# Patient Record
Sex: MALE | Race: OTHER | NOT HISPANIC OR LATINO | Employment: FULL TIME | URBAN - METROPOLITAN AREA
[De-identification: names, ages, dates, MRNs, and addresses within clinical notes are randomized per-mention and may not be internally consistent; named-entity substitution may affect disease eponyms.]

---

## 2023-02-14 ENCOUNTER — LAB VISIT (OUTPATIENT)
Dept: LAB | Facility: HOSPITAL | Age: 53
End: 2023-02-14
Attending: FAMILY MEDICINE
Payer: COMMERCIAL

## 2023-02-14 ENCOUNTER — OFFICE VISIT (OUTPATIENT)
Dept: FAMILY MEDICINE | Facility: CLINIC | Age: 53
End: 2023-02-14
Payer: COMMERCIAL

## 2023-02-14 VITALS
BODY MASS INDEX: 29.59 KG/M2 | TEMPERATURE: 98 F | HEART RATE: 64 BPM | WEIGHT: 156.75 LBS | SYSTOLIC BLOOD PRESSURE: 100 MMHG | HEIGHT: 61 IN | DIASTOLIC BLOOD PRESSURE: 68 MMHG | OXYGEN SATURATION: 98 %

## 2023-02-14 DIAGNOSIS — R53.83 FATIGUE, UNSPECIFIED TYPE: ICD-10-CM

## 2023-02-14 DIAGNOSIS — R63.0 DECREASED APPETITE: ICD-10-CM

## 2023-02-14 DIAGNOSIS — R63.4 WEIGHT LOSS: ICD-10-CM

## 2023-02-14 DIAGNOSIS — Z11.4 SCREENING FOR HIV (HUMAN IMMUNODEFICIENCY VIRUS): ICD-10-CM

## 2023-02-14 DIAGNOSIS — R10.13 EPIGASTRIC PAIN: ICD-10-CM

## 2023-02-14 DIAGNOSIS — Z12.11 ENCOUNTER FOR SCREENING COLONOSCOPY: ICD-10-CM

## 2023-02-14 DIAGNOSIS — Z11.59 ENCOUNTER FOR HEPATITIS C SCREENING TEST FOR LOW RISK PATIENT: ICD-10-CM

## 2023-02-14 DIAGNOSIS — K21.9 GASTROESOPHAGEAL REFLUX DISEASE, UNSPECIFIED WHETHER ESOPHAGITIS PRESENT: ICD-10-CM

## 2023-02-14 DIAGNOSIS — R10.13 EPIGASTRIC PAIN: Primary | ICD-10-CM

## 2023-02-14 LAB
ALBUMIN SERPL BCP-MCNC: 4.3 G/DL (ref 3.5–5.2)
ALP SERPL-CCNC: 98 U/L (ref 55–135)
ALT SERPL W/O P-5'-P-CCNC: 21 U/L (ref 10–44)
ANION GAP SERPL CALC-SCNC: 16 MMOL/L (ref 8–16)
AST SERPL-CCNC: 21 U/L (ref 10–40)
BASOPHILS # BLD AUTO: 0.16 K/UL (ref 0–0.2)
BASOPHILS NFR BLD: 1.4 % (ref 0–1.9)
BILIRUB SERPL-MCNC: 0.4 MG/DL (ref 0.1–1)
BUN SERPL-MCNC: 20 MG/DL (ref 6–20)
CALCIUM SERPL-MCNC: 10.3 MG/DL (ref 8.7–10.5)
CHLORIDE SERPL-SCNC: 103 MMOL/L (ref 95–110)
CHOLEST SERPL-MCNC: 241 MG/DL (ref 120–199)
CHOLEST/HDLC SERPL: 5.4 {RATIO} (ref 2–5)
CO2 SERPL-SCNC: 22 MMOL/L (ref 23–29)
CREAT SERPL-MCNC: 1.1 MG/DL (ref 0.5–1.4)
DIFFERENTIAL METHOD: ABNORMAL
EOSINOPHIL # BLD AUTO: 1.5 K/UL (ref 0–0.5)
EOSINOPHIL NFR BLD: 13.5 % (ref 0–8)
ERYTHROCYTE [DISTWIDTH] IN BLOOD BY AUTOMATED COUNT: 16.1 % (ref 11.5–14.5)
EST. GFR  (NO RACE VARIABLE): >60 ML/MIN/1.73 M^2
ESTIMATED AVG GLUCOSE: 108 MG/DL (ref 68–131)
GLUCOSE SERPL-MCNC: 72 MG/DL (ref 70–110)
HBA1C MFR BLD: 5.4 % (ref 4–5.6)
HCT VFR BLD AUTO: 47.4 % (ref 40–54)
HDLC SERPL-MCNC: 45 MG/DL (ref 40–75)
HDLC SERPL: 18.7 % (ref 20–50)
HGB BLD-MCNC: 14.6 G/DL (ref 14–18)
IMM GRANULOCYTES # BLD AUTO: 0.03 K/UL (ref 0–0.04)
IMM GRANULOCYTES NFR BLD AUTO: 0.3 % (ref 0–0.5)
LDLC SERPL CALC-MCNC: 168.2 MG/DL (ref 63–159)
LYMPHOCYTES # BLD AUTO: 2.9 K/UL (ref 1–4.8)
LYMPHOCYTES NFR BLD: 26 % (ref 18–48)
MCH RBC QN AUTO: 25 PG (ref 27–31)
MCHC RBC AUTO-ENTMCNC: 30.8 G/DL (ref 32–36)
MCV RBC AUTO: 81 FL (ref 82–98)
MONOCYTES # BLD AUTO: 0.9 K/UL (ref 0.3–1)
MONOCYTES NFR BLD: 8.3 % (ref 4–15)
NEUTROPHILS # BLD AUTO: 5.7 K/UL (ref 1.8–7.7)
NEUTROPHILS NFR BLD: 50.5 % (ref 38–73)
NONHDLC SERPL-MCNC: 196 MG/DL
NRBC BLD-RTO: 0 /100 WBC
PLATELET # BLD AUTO: 282 K/UL (ref 150–450)
PMV BLD AUTO: 10.3 FL (ref 9.2–12.9)
POTASSIUM SERPL-SCNC: 5 MMOL/L (ref 3.5–5.1)
PROT SERPL-MCNC: 8.7 G/DL (ref 6–8.4)
RBC # BLD AUTO: 5.83 M/UL (ref 4.6–6.2)
SODIUM SERPL-SCNC: 141 MMOL/L (ref 136–145)
T4 FREE SERPL-MCNC: 0.99 NG/DL (ref 0.71–1.51)
TRIGL SERPL-MCNC: 139 MG/DL (ref 30–150)
TSH SERPL DL<=0.005 MIU/L-ACNC: 0.21 UIU/ML (ref 0.4–4)
WBC # BLD AUTO: 11.27 K/UL (ref 3.9–12.7)

## 2023-02-14 PROCEDURE — 87389 HIV-1 AG W/HIV-1&-2 AB AG IA: CPT | Performed by: FAMILY MEDICINE

## 2023-02-14 PROCEDURE — 36415 COLL VENOUS BLD VENIPUNCTURE: CPT | Performed by: FAMILY MEDICINE

## 2023-02-14 PROCEDURE — 99204 PR OFFICE/OUTPT VISIT, NEW, LEVL IV, 45-59 MIN: ICD-10-PCS | Mod: S$GLB,,, | Performed by: FAMILY MEDICINE

## 2023-02-14 PROCEDURE — 84443 ASSAY THYROID STIM HORMONE: CPT | Performed by: FAMILY MEDICINE

## 2023-02-14 PROCEDURE — 84439 ASSAY OF FREE THYROXINE: CPT | Performed by: FAMILY MEDICINE

## 2023-02-14 PROCEDURE — 86803 HEPATITIS C AB TEST: CPT | Performed by: FAMILY MEDICINE

## 2023-02-14 PROCEDURE — 85025 COMPLETE CBC W/AUTO DIFF WBC: CPT | Performed by: FAMILY MEDICINE

## 2023-02-14 PROCEDURE — 87338 HPYLORI STOOL AG IA: CPT | Performed by: FAMILY MEDICINE

## 2023-02-14 PROCEDURE — 99204 OFFICE O/P NEW MOD 45 MIN: CPT | Mod: S$GLB,,, | Performed by: FAMILY MEDICINE

## 2023-02-14 PROCEDURE — 99999 PR PBB SHADOW E&M-NEW PATIENT-LVL III: ICD-10-PCS | Mod: PBBFAC,,, | Performed by: FAMILY MEDICINE

## 2023-02-14 PROCEDURE — 80053 COMPREHEN METABOLIC PANEL: CPT | Performed by: FAMILY MEDICINE

## 2023-02-14 PROCEDURE — 99999 PR PBB SHADOW E&M-NEW PATIENT-LVL III: CPT | Mod: PBBFAC,,, | Performed by: FAMILY MEDICINE

## 2023-02-14 PROCEDURE — 83036 HEMOGLOBIN GLYCOSYLATED A1C: CPT | Performed by: FAMILY MEDICINE

## 2023-02-14 PROCEDURE — 80061 LIPID PANEL: CPT | Performed by: FAMILY MEDICINE

## 2023-02-14 NOTE — PROGRESS NOTES
(Portions of this note were dictated using voice recognition software and may contain dictation related errors in spelling/grammar/syntax not found on text review)    CC:   Chief Complaint   Patient presents with    Abdominal Pain     Pt stated he have a trip 4 month  every since he had  the trip he have a lot fatige    Fatigue     Weight loss since 4 month        HPI: 52 y.o. male presented for evaluation of abdominal pain and fatigue accompanied with wife, he is new to me.  He has nonsignificant medical history on file, does not take any prescription medications, has no PCP.  Patient reports that he visited \[facility]\"" 5 months ago and stayed there for 1 month, ever since he came back he started having abdominal pain, it is located in the epigastrium with radiation towards the left upper quadrant, describes as dull ache, it is intermittent, rates as 1/10 in intensity right now, although it varies in severity, when it started he did have diarrhea for few days which resolved, now he is having normal bowel movements, his appetite is decreased and he has lost 10-15 lb in the last few months, he also feels more tired and wants to sleep all the time, he used to be very active person.  Of note 1 of his colleagues in [facility] became sick and was diagnosed with having parasite which was treated, patient did have minor upper respiratory infection but otherwise was not sick during his trip, he also was vaccinated and took prophylactic antibiotics before going there.  He denies having any fever, chills, cough, shortness of breath, chest pain, nausea, vomiting, changes in bowel habits, urine problems including burning and dysuria.    History reviewed. No pertinent past medical history.    Past Surgical History:   Procedure Laterality Date    COSMETIC SURGERY         Family History   Problem Relation Age of Onset    Diabetes Mother     Heart disease Mother     Cataracts Father        Social History     Tobacco Use    Smoking  status: Never    Smokeless tobacco: Never   Substance Use Topics    Alcohol use: Never    Drug use: Never       No results found for: WBC, HGB, HCT, MCV, PLT, CHOL, TRIG, HDL, LDLDIRECT, ALT, AST, BILITOT, ALKPHOS, NA, K, CL, CREATININE, ESTGFRAFRICA, EGFRNONAA, CALCIUM, ALBUMIN, BUN, CO2, TSH, PSA, PSADIAG, INR, LABA1C, HGBA1C, MICROALBUR, MICALBCREAT, LDLCALC, GLU, [id number]          Vital signs reviewed  PE:   APPEARANCE: Well nourished, well developed, in no acute distress.    HEAD: Normocephalic, atraumatic.  EYES: EOMI.  Conjunctivae noninjected.  NECK: Supple with no cervical lymphadenopathy.    CHEST: Good inspiratory effort. Lungs clear to auscultation with no wheezes or crackles.  CARDIOVASCULAR: Normal S1, S2. No rubs, murmurs, or gallops.  ABDOMEN: Bowel sounds normal. Not distended. Soft.mild TTP in epigastrium, No organomegaly.  EXTREMITIES: No edema, cyanosis, or clubbing.    Review of Systems   Constitutional:  Positive for fatigue. Negative for chills and fever.   HENT: Negative.     Respiratory:  Negative for cough, shortness of breath and wheezing.    Cardiovascular:  Negative for chest pain, palpitations and leg swelling.   Gastrointestinal:  Positive for abdominal pain. Negative for change in bowel habit, constipation, diarrhea, nausea, vomiting, reflux and change in bowel habit.   Genitourinary: Negative.    Musculoskeletal: Negative.    Neurological: Negative.    Psychiatric/Behavioral: Negative.     All other systems reviewed and are negative.    IMPRESSION  1. Epigastric pain    2. Fatigue, unspecified type    3. Weight loss    4. Decreased appetite    5. Encounter for hepatitis C screening test for low risk patient    6. Screening for HIV (human immunodeficiency virus)    7. Encounter for screening colonoscopy    8. Gastroesophageal reflux disease, unspecified whether esophagitis present            PLAN      1. Epigastric pain    - H. pylori antigen, stool; Future      2. Fatigue,  unspecified type    - CBC Auto Differential; Future  - Comprehensive Metabolic Panel; Future  - Lipid Panel; Future  - Hemoglobin A1C; Future  - TSH; Future  - T4, Free; Future      3. Weight loss    - CBC Auto Differential; Future  - Comprehensive Metabolic Panel; Future  - Lipid Panel; Future  - Hemoglobin A1C; Future  - TSH; Future  - T4, Free; Future      4. Decreased appetite    - CBC Auto Differential; Future  - Comprehensive Metabolic Panel; Future  - Lipid Panel; Future  - Hemoglobin A1C; Future  - TSH; Future  - T4, Free; Future      5. Encounter for hepatitis C screening test for low risk patient    - Hepatitis C Antibody; Future      6. Screening for HIV (human immunodeficiency virus)  - HIV 1/2 Ag/Ab (4th Gen); Future      7. Encounter for screening colonoscopy    - Case Request Endoscopy: COLONOSCOPY      8. Gastroesophageal reflux disease, unspecified whether esophagitis present    Advised to avoid spicy food   Advised to start taking over-the-counter Pepcid  To add more fiber and increase water intake         Age/demographic appropriate health maintenance:    Health Maintenance Due   Topic Date Due    Hepatitis C Screening  Never done    Lipid Panel  Never done    COVID-19 Vaccine (1) Never done    HIV Screening  Never done    TETANUS VACCINE  Never done    Hemoglobin A1c (Diabetic Prevention Screening)  Never done    Colorectal Cancer Screening  Never done    Influenza Vaccine (1) Never done         Advised to establish care with PCP    Rabia Ponce   2/14/2023

## 2023-02-15 ENCOUNTER — TELEPHONE (OUTPATIENT)
Dept: FAMILY MEDICINE | Facility: CLINIC | Age: 53
End: 2023-02-15
Payer: COMMERCIAL

## 2023-02-15 DIAGNOSIS — R19.7 DIARRHEA, UNSPECIFIED TYPE: Primary | ICD-10-CM

## 2023-02-15 LAB
HCV AB SERPL QL IA: NORMAL
HIV 1+2 AB+HIV1 P24 AG SERPL QL IA: NORMAL

## 2023-02-15 NOTE — TELEPHONE ENCOUNTER
Pt came to the office and stated that he has to drop off a sample of his stool for a H. Pylori test and seen some of his results and would like to know if Dr. Ponce would like to add additional orders for his stool since his EOS and Eosinophil is elevated. Pls advise.

## 2023-02-16 ENCOUNTER — PATIENT MESSAGE (OUTPATIENT)
Dept: FAMILY MEDICINE | Facility: CLINIC | Age: 53
End: 2023-02-16
Payer: COMMERCIAL

## 2023-02-16 ENCOUNTER — TELEPHONE (OUTPATIENT)
Dept: FAMILY MEDICINE | Facility: CLINIC | Age: 53
End: 2023-02-16
Payer: COMMERCIAL

## 2023-02-16 NOTE — TELEPHONE ENCOUNTER
Pt went to lab to drop off stool specimen and stated that the orders were finalized and not active. Please put in new orders for H. pylori antigen, stool. Pls advise.

## 2023-02-16 NOTE — TELEPHONE ENCOUNTER
Pt needs to submit new stool sample for additional test and he is able to figure out at [facility] [employer] on next messages

## 2023-02-16 NOTE — TELEPHONE ENCOUNTER
----- Message from Ivy Hamilton sent at 2/15/2023 11:13 AM CST -----  Regarding: call back  Contact: [phone]  Who Called: PT     TEST RESULTS:   Patient would like to get test results.  Name of test (lab, mammo, etc.): Lab   Date of test: 2/14/13    Patient needs additional labs to be placed in epic due to parasites.

## 2023-02-23 ENCOUNTER — PATIENT MESSAGE (OUTPATIENT)
Dept: FAMILY MEDICINE | Facility: CLINIC | Age: 53
End: 2023-02-23
Payer: COMMERCIAL

## 2023-02-24 LAB
H PYLORI AG STL QL IA: NOT DETECTED
SPECIMEN SOURCE: 1

## 2023-03-08 ENCOUNTER — PATIENT MESSAGE (OUTPATIENT)
Dept: FAMILY MEDICINE | Facility: CLINIC | Age: 53
End: 2023-03-08
Payer: COMMERCIAL

## 2023-03-22 ENCOUNTER — PATIENT OUTREACH (OUTPATIENT)
Dept: ADMINISTRATIVE | Facility: HOSPITAL | Age: 53
End: 2023-03-22
Payer: COMMERCIAL

## 2023-03-22 ENCOUNTER — PATIENT MESSAGE (OUTPATIENT)
Dept: ADMINISTRATIVE | Facility: HOSPITAL | Age: 53
End: 2023-03-22
Payer: COMMERCIAL

## 2023-03-22 NOTE — PROGRESS NOTES
Care Everywhere updates requested and reviewed.  Immunizations reconciled. Media reports reviewed.  Duplicate HM overrides and  orders removed.  Overdue HM topic chart audit and/or requested.  Overdue lab testing linked to upcoming lab appointments if applies.        Health Maintenance Due   Topic Date Due    COVID-19 Vaccine (1) Never done    TETANUS VACCINE  Never done    Colorectal Cancer Screening  Never done    Influenza Vaccine (1) Never done

## 2023-04-05 ENCOUNTER — OFFICE VISIT (OUTPATIENT)
Dept: FAMILY MEDICINE | Facility: CLINIC | Age: 53
End: 2023-04-05
Payer: COMMERCIAL

## 2023-04-05 VITALS
OXYGEN SATURATION: 99 % | TEMPERATURE: 98 F | SYSTOLIC BLOOD PRESSURE: 118 MMHG | HEART RATE: 70 BPM | WEIGHT: 159.38 LBS | BODY MASS INDEX: 31.29 KG/M2 | HEIGHT: 60 IN | DIASTOLIC BLOOD PRESSURE: 66 MMHG

## 2023-04-05 DIAGNOSIS — R71.8 LOW MEAN CORPUSCULAR VOLUME (MCV): ICD-10-CM

## 2023-04-05 DIAGNOSIS — Z76.89 ENCOUNTER TO ESTABLISH CARE WITH NEW DOCTOR: Primary | ICD-10-CM

## 2023-04-05 DIAGNOSIS — E78.00 PURE HYPERCHOLESTEROLEMIA: ICD-10-CM

## 2023-04-05 DIAGNOSIS — R79.89 LOW TSH LEVEL: ICD-10-CM

## 2023-04-05 DIAGNOSIS — Z00.00 WELLNESS EXAMINATION: ICD-10-CM

## 2023-04-05 DIAGNOSIS — Z87.19 H/O GASTROESOPHAGEAL REFLUX (GERD): ICD-10-CM

## 2023-04-05 PROCEDURE — 99396 PR PREVENTIVE VISIT,EST,40-64: ICD-10-PCS | Mod: S$GLB,,, | Performed by: STUDENT IN AN ORGANIZED HEALTH CARE EDUCATION/TRAINING PROGRAM

## 2023-04-05 PROCEDURE — 99999 PR PBB SHADOW E&M-EST. PATIENT-LVL III: ICD-10-PCS | Mod: PBBFAC,,, | Performed by: STUDENT IN AN ORGANIZED HEALTH CARE EDUCATION/TRAINING PROGRAM

## 2023-04-05 PROCEDURE — 99396 PREV VISIT EST AGE 40-64: CPT | Mod: S$GLB,,, | Performed by: STUDENT IN AN ORGANIZED HEALTH CARE EDUCATION/TRAINING PROGRAM

## 2023-04-05 PROCEDURE — 99999 PR PBB SHADOW E&M-EST. PATIENT-LVL III: CPT | Mod: PBBFAC,,, | Performed by: STUDENT IN AN ORGANIZED HEALTH CARE EDUCATION/TRAINING PROGRAM

## 2023-04-05 RX ORDER — CALC/MAG/B COMPLEX/D3/HERB 61
15 TABLET ORAL DAILY
COMMUNITY

## 2023-04-05 NOTE — PROGRESS NOTES
Subjective:       Patient ID: Oswaldo Cifuentes is a 52 y.o. male.    Chief Complaint: Establish Care      Active Problem List with Overview Notes    Diagnosis Date Noted    Pure hypercholesterolemia 04/05/2023     + fam hx CVD  LDL > 160   Diet is ok; healthy weight; exercises   He is not a fan of medications and prefers to avoid or try lifestyle changes first           Low mean corpuscular volume (MCV) 04/05/2023     Suspect thalassemia though with recent GI issues since mission trip to [facility] will get iron studies to r/o as cause  Will go ahead with electrophoresis   He does note this has been an ongoing thing           Low TSH level 04/05/2023     Takes daily vitamin with biotin  Will have him hold for 1 week prior to labs and recheck   Will go ahead and get thyroid panel to be sure  He notes this has also been an ongoing issue        H/O gastroesophageal reflux (GERD) 04/05/2023     Since recent trip to [facility] had GI issues  Lost weight etc  He started prevacid and has been feeling a lot better and going a lot better overall with this  Does continue with AM stomach burning/upset prior to taking pill  Takes only 15mg daily  He will try in PM to see if helps with AM symptoms   Long term use risks/benefits discussed; he does want to get off; we discussed continuing for another month then attempting to wean off with every other day dosing then can stop to try and avoid rebound              Review of Systems   All other systems reviewed and are negative.     A1C:  Recent Labs   Lab 02/14/23  1522   Hemoglobin A1C 5.4     CBC:  Recent Labs   Lab 02/14/23  1522   WBC 11.27   RBC 5.83   Hemoglobin 14.6   Hematocrit 47.4   Platelets 282   MCV 81 L   MCH 25.0 L   MCHC 30.8 L     CMP:  Recent Labs   Lab 02/14/23  1522   Glucose 72   Calcium 10.3   Albumin 4.3   Total Protein 8.7 H   Sodium 141   Potassium 5.0   CO2 22 L   Chloride 103   BUN 20   Creatinine 1.1   Alkaline Phosphatase 98   ALT 21   AST 21   Total Bilirubin 0.4      LIPIDS:  Recent Labs   Lab 02/14/23  1522   TSH 0.215 L   HDL 45   Cholesterol 241 H   Triglycerides 139   LDL Cholesterol 168.2 H   HDL/Cholesterol Ratio 18.7 L   Non-HDL Cholesterol 196   Total Cholesterol/HDL Ratio 5.4 H     TSH:  Recent Labs   Lab 02/14/23  1522   TSH 0.215 L        Objective:      Vitals:    04/05/23 0941   BP: 118/66   Pulse: 70   Temp: 98.1 °F (36.7 °C)      Physical Exam  Vitals reviewed.   Constitutional:       Appearance: Normal appearance. He is normal weight.   HENT:      Head: Normocephalic and atraumatic.   Eyes:      Conjunctiva/sclera: Conjunctivae normal.   Cardiovascular:      Rate and Rhythm: Normal rate and regular rhythm.      Heart sounds: Normal heart sounds.   Pulmonary:      Effort: Pulmonary effort is normal.      Breath sounds: Normal breath sounds.   Abdominal:      Palpations: Abdomen is soft.      Tenderness: There is no abdominal tenderness.   Musculoskeletal:         General: Normal range of motion.      Cervical back: Normal range of motion.      Right lower leg: No edema.      Left lower leg: No edema.   Neurological:      General: No focal deficit present.      Mental Status: He is alert and oriented to person, place, and time.   Psychiatric:         Mood and Affect: Mood normal.         Behavior: Behavior normal.        Assessment:       1. Encounter to establish care with new doctor    2. Wellness examination    3. Pure hypercholesterolemia    4. Low mean corpuscular volume (MCV)    5. Low TSH level    6. H/O gastroesophageal reflux (GERD)        Plan:   1. Encounter to establish care with new doctor    2. Wellness examination    3. Pure hypercholesterolemia  - Apolipoprotein B; Future  - Lipid Panel; Standing    4. Low mean corpuscular volume (MCV)  - Iron and TIBC; Future  - FERRITIN; Future  - CBC Auto Differential; Future  - HEMOGLOBIN ELECTROPHORESIS; Future    5. Low TSH level  - TSH; Standing  - T4, free; Future  - T3; Future  - Thyroid peroxidase  antibody; Future  - THYROTROPIN RECEPTOR ANTIBODY; Future    6. H/O gastroesophageal reflux (GERD)     Establish care and Well male  Labs reviewed in detail; additional labs per orders to be done prior to next visit once has new insurance   HM discussed  DEFER for next visit when gets new insurance  Continue healthy lifestyle efforts  We discussed diet and exercise in detail; I did advise statin therapy for cholesterol; he would like to try aggressive lifestyle changes (maybe red yeast rice) as first line and recheck levels in 3 months   Continue current meds as prescribed otherwise; refills per request  Keep routine specialist f/u   RTC in 3 months  with labs prior and/or PRN          Sarah A. Champagne [facility]   4/5/23

## 2023-04-11 ENCOUNTER — PATIENT MESSAGE (OUTPATIENT)
Dept: ADMINISTRATIVE | Facility: HOSPITAL | Age: 53
End: 2023-04-11
Payer: COMMERCIAL

## 2023-05-15 ENCOUNTER — OFFICE VISIT (OUTPATIENT)
Dept: FAMILY MEDICINE | Facility: CLINIC | Age: 53
End: 2023-05-15
Payer: COMMERCIAL

## 2023-05-15 VITALS
HEIGHT: 69 IN | BODY MASS INDEX: 21.32 KG/M2 | WEIGHT: 143.94 LBS | SYSTOLIC BLOOD PRESSURE: 128 MMHG | OXYGEN SATURATION: 98 % | DIASTOLIC BLOOD PRESSURE: 82 MMHG | TEMPERATURE: 99 F | HEART RATE: 97 BPM

## 2023-05-15 DIAGNOSIS — B35.6 TINEA CRURIS: Primary | ICD-10-CM

## 2023-05-15 PROCEDURE — 99214 OFFICE O/P EST MOD 30 MIN: CPT | Mod: S$GLB,,, | Performed by: STUDENT IN AN ORGANIZED HEALTH CARE EDUCATION/TRAINING PROGRAM

## 2023-05-15 PROCEDURE — 99999 PR PBB SHADOW E&M-EST. PATIENT-LVL III: ICD-10-PCS | Mod: PBBFAC,,, | Performed by: STUDENT IN AN ORGANIZED HEALTH CARE EDUCATION/TRAINING PROGRAM

## 2023-05-15 PROCEDURE — 99214 PR OFFICE/OUTPT VISIT, EST, LEVL IV, 30-39 MIN: ICD-10-PCS | Mod: S$GLB,,, | Performed by: STUDENT IN AN ORGANIZED HEALTH CARE EDUCATION/TRAINING PROGRAM

## 2023-05-15 PROCEDURE — 99999 PR PBB SHADOW E&M-EST. PATIENT-LVL III: CPT | Mod: PBBFAC,,, | Performed by: STUDENT IN AN ORGANIZED HEALTH CARE EDUCATION/TRAINING PROGRAM

## 2023-05-15 RX ORDER — CLOTRIMAZOLE AND BETAMETHASONE DIPROPIONATE 10; .64 MG/G; MG/G
CREAM TOPICAL 2 TIMES DAILY
Qty: 15 G | Refills: 1 | Status: SHIPPED | OUTPATIENT
Start: 2023-05-15 | End: 2023-05-29

## 2023-05-15 NOTE — PROGRESS NOTES
Subjective:      Patient ID: Oswaldo Cifuentes is a 52 y.o. male.    Chief Complaint: Testicle Pain (Raised bumps on testicle area/ has been nida on for 3 weeks/ redness/ )    - lots of driving for work past few weeks and started with irritation; tried alcohol and peroxide; continues to get worse; tender to palpation; raised, red, warm    Testicle Pain  The patient's primary symptoms include genital itching, scrotal swelling and testicular pain. The patient's pertinent negatives include no penile pain. This is a new problem. The current episode started in the past 7 days. The problem occurs constantly. The problem has been gradually worsening. The pain is moderate. The testicular pain affects the left testicle. There is swelling in the left testicle. The color of the testicles is Red. The symptoms are aggravated by tactile pressure. Treatments tried: rubbing alcohol and peroxide. The treatment provided no relief. His sexual activity is non-contributory to the current illness.   Review of Systems   Genitourinary:  Positive for scrotal swelling and testicular pain. Negative for penile pain.   All other systems reviewed and are negative.     Objective:     Vitals:    05/15/23 1309   BP: 128/82   Pulse: 97   Temp: 99 °F (37.2 °C)      Physical Exam  Constitutional:       Appearance: Normal appearance.   HENT:      Head: Atraumatic.   Eyes:      Conjunctiva/sclera: Conjunctivae normal.   Pulmonary:      Effort: Pulmonary effort is normal.   Genitourinary:     Testes:         Left: Tenderness and swelling present.      Comments: Red, inflamed ulcerative lesion some scabbing; consistent with yeast infection  Neurological:      General: No focal deficit present.      Mental Status: He is alert and oriented to person, place, and time.   Psychiatric:         Mood and Affect: Mood normal.         Behavior: Behavior normal.      Assessment:         1. Tinea cruris          Plan:   1. Tinea cruris  - clotrimazole-betamethasone  1-0.05% (LOTRISONE) cream; Apply topically 2 (two) times daily. for 14 days  Dispense: 15 g; Refill: 1       Start Lotrisone to area 2x/day for 2 weeks max; if no improvement within 1 week will send to urology   STOP alcohol and peroxide   RTC PRN         Sarah A. Champagne [facility]   5/15/23

## 2023-05-25 ENCOUNTER — OFFICE VISIT (OUTPATIENT)
Dept: FAMILY MEDICINE | Facility: CLINIC | Age: 53
End: 2023-05-25
Payer: COMMERCIAL

## 2023-05-25 VITALS
WEIGHT: 147.69 LBS | DIASTOLIC BLOOD PRESSURE: 78 MMHG | TEMPERATURE: 98 F | HEIGHT: 69 IN | BODY MASS INDEX: 21.87 KG/M2 | SYSTOLIC BLOOD PRESSURE: 120 MMHG | OXYGEN SATURATION: 98 % | HEART RATE: 69 BPM

## 2023-05-25 DIAGNOSIS — N50.9 LESION OF SCROTUM: Primary | ICD-10-CM

## 2023-05-25 PROCEDURE — 99214 PR OFFICE/OUTPT VISIT, EST, LEVL IV, 30-39 MIN: ICD-10-PCS | Mod: S$GLB,,, | Performed by: PEDIATRICS

## 2023-05-25 PROCEDURE — 99214 OFFICE O/P EST MOD 30 MIN: CPT | Mod: S$GLB,,, | Performed by: PEDIATRICS

## 2023-05-25 PROCEDURE — 87591 N.GONORRHOEAE DNA AMP PROB: CPT | Performed by: PEDIATRICS

## 2023-05-25 PROCEDURE — 99999 PR PBB SHADOW E&M-EST. PATIENT-LVL III: ICD-10-PCS | Mod: PBBFAC,,, | Performed by: PEDIATRICS

## 2023-05-25 PROCEDURE — 99999 PR PBB SHADOW E&M-EST. PATIENT-LVL III: CPT | Mod: PBBFAC,,, | Performed by: PEDIATRICS

## 2023-05-25 PROCEDURE — 87529 HSV DNA AMP PROBE: CPT | Mod: 59 | Performed by: PEDIATRICS

## 2023-05-25 NOTE — PROGRESS NOTES
Subjective:      Patient ID: Oswaldo Cifuentes is a 52 y.o. male.    Chief Complaint: Rash (Rash on scrotum, has gotten worst the skin is now one big bump,painful  still using cream. )    Patient here today for rash on scrotum, reports it has gotten worse since seeing Dr. Serrato and using lotrisone. Wants to have it looked at again and see if there is another way to treat it. 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 Reports rash hurts worse when he sits for long periods of time.    Review of Systems   Constitutional:  Negative for chills, fatigue and fever.   HENT:  Negative for congestion, ear pain, postnasal drip, rhinorrhea, sinus pressure, sinus pain, sneezing and sore throat.    Respiratory:  Negative for cough, chest tightness, shortness of breath and wheezing.    Cardiovascular:  Negative for chest pain and palpitations.   Gastrointestinal:  Negative for diarrhea, nausea and vomiting.   Genitourinary:  Positive for testicular pain. Negative for difficulty urinating, frequency, hematuria, penile swelling, scrotal swelling and urgency.   Musculoskeletal:  Negative for arthralgias.   Skin:  Negative for rash.   Neurological:  Negative for dizziness and headaches.   Psychiatric/Behavioral:  Negative for confusion.       Current Outpatient Medications on File Prior to Visit   Medication Sig    clotrimazole-betamethasone 1-0.05% (LOTRISONE) cream Apply topically 2 (two) times daily. for 14 days    lansoprazole (PREVACID) 15 MG capsule Take 15 mg by mouth once daily.     No current facility-administered medications on file prior to visit.        Objective:     Vitals:    05/25/23 1034   BP: 120/78   Pulse: 69   Temp: 98.2 °F (36.8 °C)      Physical Exam  Constitutional:       General: He is not in acute distress.     Appearance: Normal appearance.   HENT:      Head: Normocephalic and atraumatic.      Right Ear: External ear normal.      Left Ear:  External ear normal.      Nose: Nose normal.      Mouth/Throat:      Mouth: Mucous membranes are moist.      Pharynx: Oropharynx is clear.   Eyes:      Extraocular Movements: Extraocular movements intact.      Conjunctiva/sclera: Conjunctivae normal.      Pupils: Pupils are equal, round, and reactive to light.   Cardiovascular:      Rate and Rhythm: Normal rate and regular rhythm.      Pulses: Normal pulses.      Heart sounds: Normal heart sounds.   Pulmonary:      Effort: Pulmonary effort is normal. No respiratory distress.      Breath sounds: Normal breath sounds. No wheezing.   Abdominal:      General: Abdomen is flat.   Genitourinary:         Comments: Area of skin lesion in marked spot. Lesion is tender to the touch and raised from the skin. There is no wound, laceration, or opened sore.  Musculoskeletal:         General: No swelling. Normal range of motion.      Cervical back: Normal range of motion and neck supple.   Skin:     General: Skin is warm and dry.      Findings: No rash.   Neurological:      Mental Status: He is alert and oriented to person, place, and time.      Gait: Gait normal.   Psychiatric:         Mood and Affect: Mood normal.         Behavior: Behavior normal.      Assessment:         1. Lesion of scrotum          Plan:   1. Lesion of scrotum  - C. trachomatis/N. gonorrhoeae by AMP DNA [facility]; Urine  - FTA antibodies, IgG and IgM; Future  - HIV 1/2 Ag/Ab (4th Gen); Future  - HERPES SIMPLEX 1 & 2 IGM; Future  - HERPES SIMPLEX VIRUS (HSV) TYPE 1 & 2 DNA BY PCR; Future  - Ambulatory referral/consult to Urology; Future         Cultures collected today. Will do labs also.  Will refer to urology for further evaluation.  Follow up if symptoms worsen or fail to improve.       Daniele W. Knight, NP [facility]  5/25/23

## 2023-05-26 LAB
C TRACH DNA SPEC QL NAA+PROBE: NOT DETECTED
N GONORRHOEA DNA SPEC QL NAA+PROBE: NOT DETECTED

## 2023-05-27 LAB
HSV1 DNA SPEC QL NAA+PROBE: NEGATIVE
HSV2 DNA SPEC QL NAA+PROBE: NEGATIVE
SPECIMEN SOURCE: NORMAL

## 2023-05-29 ENCOUNTER — PATIENT MESSAGE (OUTPATIENT)
Dept: FAMILY MEDICINE | Facility: CLINIC | Age: 53
End: 2023-05-29
Payer: COMMERCIAL

## 2023-05-30 ENCOUNTER — TELEPHONE (OUTPATIENT)
Dept: FAMILY MEDICINE | Facility: CLINIC | Age: 53
End: 2023-05-30
Payer: COMMERCIAL

## 2023-05-30 DIAGNOSIS — B00.9 HSV (HERPES SIMPLEX VIRUS) INFECTION: Primary | ICD-10-CM

## 2023-05-30 RX ORDER — VALACYCLOVIR HYDROCHLORIDE 500 MG/1
500 TABLET, FILM COATED ORAL 2 TIMES DAILY
Qty: 6 TABLET | Refills: 3 | Status: SHIPPED | OUTPATIENT
Start: 2023-05-30 | End: 2023-06-12

## 2023-05-30 NOTE — TELEPHONE ENCOUNTER
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 Valcyclovir sent to pharmacy. Patient verbalizes understanding.

## 2023-05-30 NOTE — TELEPHONE ENCOUNTER
----- Message from Jude Zuleta sent at 5/30/2023  1:08 PM CDT -----  Contact: pt  Type: Requesting to speak with nurse        Who Called: PT  Regarding:would like to speak to Dario Tellez regarding test results   Would the patient rather a call back or a response via [facility]? Call back  Best Call Back Number: [phone]  Additional Information:

## 2023-05-30 NOTE — TELEPHONE ENCOUNTER
----- Message from Edy Calvin sent at 5/30/2023  2:59 PM CDT -----  Contact: Pt  .Type:  Needs Medical Advice    Who Called: pt    Would the patient rather a call back or a response via [facility]?  Call back  Best Call Back Number: [phone]  Additional Information: Pt. Is returning a call back to the office

## 2023-05-30 NOTE — TELEPHONE ENCOUNTER
Per MA: Pt requests no results or medical issues be discussed with wife no other issues or concerns; chart was marked to ensure no information was revealed to wife as requested

## 2023-05-30 NOTE — TELEPHONE ENCOUNTER
Returned pt's call pt stated that he only wants to speak with his doctor that this is a legal matter, pt would not tell me what he wants to speak with doctor about will only discus with his doctor per pt. Pt states that he can be reached at [phone]

## 2023-06-02 ENCOUNTER — PATIENT MESSAGE (OUTPATIENT)
Dept: FAMILY MEDICINE | Facility: CLINIC | Age: 53
End: 2023-06-02
Payer: COMMERCIAL

## 2023-06-02 ENCOUNTER — TELEPHONE (OUTPATIENT)
Dept: FAMILY MEDICINE | Facility: CLINIC | Age: 53
End: 2023-06-02
Payer: COMMERCIAL

## 2023-06-02 NOTE — TELEPHONE ENCOUNTER
----- Message from Su Livingston sent at 6/2/2023 10:28 AM CDT -----  Type:  Needs Medical Advice    Who Called:  Pt  Symptoms (please be specific):  Rash is spreading on pt left leg  How long has patient had these symptoms:   1 day  Pharmacy name and phone #:   [id number] - [place], [place] - [address]   Phone:  [phone]  Would the patient rather a call back or a response via [facility]?  call  Best Call Back Number:  [phone]  Additional Information:  Pt would like to get a call back on what next steps to take about his symptoms.

## 2023-06-02 NOTE — TELEPHONE ENCOUNTER
Spoke with pt. Pt states that he is almost finished taking valacyclovir, but states that the rash has not spread to his leg. Pt would like to know if he needs to come in or if he needs to come in for another appointment? Please advise.

## 2023-06-02 NOTE — TELEPHONE ENCOUNTER
----- Message from Isi Lama sent at 6/2/2023 10:46 AM CDT -----    Additional notes: Type:  Patient Returning Call    Who Called: pt  Who Left Message for Patient: COTY Kang  Does the patient know what this is regarding?:  Would the patient rather a call back or a response via [facility]? call  Best Call Back Number: [phone]  Additional Information: pt said there's bad reception where he is so if the call drops again when you call to call back

## 2023-06-08 ENCOUNTER — OFFICE VISIT (OUTPATIENT)
Dept: UROLOGY | Facility: CLINIC | Age: 53
End: 2023-06-08
Payer: COMMERCIAL

## 2023-06-08 ENCOUNTER — TELEPHONE (OUTPATIENT)
Dept: FAMILY MEDICINE | Facility: CLINIC | Age: 53
End: 2023-06-08
Payer: COMMERCIAL

## 2023-06-08 VITALS
HEART RATE: 77 BPM | HEIGHT: 69 IN | WEIGHT: 145.19 LBS | SYSTOLIC BLOOD PRESSURE: 114 MMHG | DIASTOLIC BLOOD PRESSURE: 72 MMHG | BODY MASS INDEX: 21.51 KG/M2

## 2023-06-08 DIAGNOSIS — B00.9 HSV (HERPES SIMPLEX VIRUS) INFECTION: Primary | ICD-10-CM

## 2023-06-08 DIAGNOSIS — N50.9 LESION OF SCROTUM: ICD-10-CM

## 2023-06-08 PROCEDURE — 99999 PR PBB SHADOW E&M-EST. PATIENT-LVL III: ICD-10-PCS | Mod: PBBFAC,,, | Performed by: UROLOGY

## 2023-06-08 PROCEDURE — 99999 PR PBB SHADOW E&M-EST. PATIENT-LVL III: CPT | Mod: PBBFAC,,, | Performed by: UROLOGY

## 2023-06-08 PROCEDURE — 99204 OFFICE O/P NEW MOD 45 MIN: CPT | Mod: S$GLB,,, | Performed by: UROLOGY

## 2023-06-08 PROCEDURE — 99204 PR OFFICE/OUTPT VISIT, NEW, LEVL IV, 45-59 MIN: ICD-10-PCS | Mod: S$GLB,,, | Performed by: UROLOGY

## 2023-06-08 NOTE — TELEPHONE ENCOUNTER
Spoke with patient in regards of his message. Pt states that he currently has a rash, but has been taking medication to clear it up and the meds are working. Pt states that he saw Urology today , and was advised that he needs to follow up with his PCP. Pt wants to know does he need to see you again for an follow up appt. Patient also wanted to know what type of herpes does he have, because it was never noted on his results. Please advise pt message.

## 2023-06-08 NOTE — TELEPHONE ENCOUNTER
Labs do not show which herpes but we can get this at next follow up if he would like either way it does not change the treatment; he does not need a follow up if the rash is improving

## 2023-06-08 NOTE — TELEPHONE ENCOUNTER
Called and spoke with pt in regards of Dr. Serrato's message. Pt verbalized understanding of message. Pt wants to know if he can do another lab since it is still somewhat present to figure out what type of herpes that he  has. Please advise patient message.

## 2023-06-08 NOTE — PROGRESS NOTES
"[facility]   Clinic Note    SUBJECTIVE:     Chief Complaint   Patient presents with    Other     Lesion of scrotum        Referral from: Dario Tellez NP.    History of Present Illness:  Oswaldo Cifuentes is a 52 y.o. male who presents to clinic for scrotal lesion c/w HSV.      Patient here for evaluation of a scrotal skin lesion.  Reports this area on the left side of the skin erupted with herpetic lesions.  These were swabbed in appear consistent with HSV.  Patient has been on Valtrex with good response.  He was referred here by his primary care provider.  No hematuria.  No dysuria.  No other complaints.    Patient endorses no additional complaints at this time.    History reviewed. No pertinent past medical history.    Past Surgical History:   Procedure Laterality Date    COSMETIC SURGERY         Family History   Problem Relation Age of Onset    Diabetes Mother     Heart disease Mother     Cataracts Father        Social History     Tobacco Use    Smoking status: Never    Smokeless tobacco: Never   Substance Use Topics    Alcohol use: Never    Drug use: Never       Current Outpatient Medications on File Prior to Visit   Medication Sig Dispense Refill    lansoprazole (PREVACID) 15 MG capsule Take 15 mg by mouth once daily.      valACYclovir (VALTREX) 500 MG tablet Take 1 tablet (500 mg total) by mouth 2 (two) times daily. for 3 days 6 tablet 3     No current facility-administered medications on file prior to visit.       Review of patient's allergies indicates:   Allergen Reactions    Peanut (legumes) Other (See Comments)       Review of Systems:  A review of 10+ systems was conducted with pertinent positive and negative findings documented in HPI with all other systems reviewed and negative.    OBJECTIVE:     Estimated body mass index is 21.44 kg/m² as calculated from the following:    Height as of this encounter: 5' 9" (1.753 m).    Weight as of this encounter: 65.9 kg (145 lb 3.2 oz).    Vital Signs " (Most Recent)  Vitals:    06/08/23 0951   BP: 114/72   Pulse: 77       Physical Exam:  GENERAL: patient sitting comfortably  HEENT: normocephalic  NECK: supple, no JVD  PULM: normal chest rise, no increased WOB  HEART: non-diaphoretic  ABDO: soft, nondistended, nontender  BACK: no CVA tenderness bilaterally  SKIN: warm, dry, well perfused  EXT: no bruising or edema  NEURO: grossly normal with no focal deficits  PSYCH: appropriate mood and affect    Genitourinary Exam:  Scrotal skin lesions c/w resolving HSV    Lab Results   Component Value Date    BUN 20 02/14/2023    CREATININE 1.1 02/14/2023    WBC 11.27 02/14/2023    HGB 14.6 02/14/2023    HCT 47.4 02/14/2023     02/14/2023    AST 21 02/14/2023    ALT 21 02/14/2023    ALKPHOS 98 02/14/2023    ALBUMIN 4.3 02/14/2023    HGBA1C 5.4 02/14/2023        Imaging:  I have personally reviewed all relevant imaging studies.    No results found for this or any previous visit (from the past 2160 hour(s)).  No results found for this or any previous visit (from the past 2160 hour(s)).  No image results found.       PSA:  No results found for: PSA, PSADIAG, PSATOTAL, PSAFREE    Testosterone:  No results found for: TOTALTESTOST, TOTALTESTOST, TESTOSTERONE     ASSESSMENT     1. Lesion of scrotum        PLAN:     Scrotal lesion likely resolving HSV     - On valcyclovir, being treated by PCP.    - Follow up with PCP for herpes treatment, no treatment necessary from urology    - Any recurrences or skin related issues should be directed to Dermatology    - Follow up with me PRN    Mayo Wheeler MD  Urology  [facility] [facility] [place]    Disclaimer: This note has been generated using voice-recognition software. There may be typographical errors that have been missed during proof-reading.

## 2023-06-12 ENCOUNTER — PATIENT MESSAGE (OUTPATIENT)
Dept: FAMILY MEDICINE | Facility: CLINIC | Age: 53
End: 2023-06-12
Payer: COMMERCIAL

## 2023-06-12 RX ORDER — VALACYCLOVIR HYDROCHLORIDE 1 G/1
1000 TABLET, FILM COATED ORAL 2 TIMES DAILY
Qty: 14 TABLET | Refills: 0 | Status: SHIPPED | OUTPATIENT
Start: 2023-06-12 | End: 2023-06-19

## 2023-06-12 NOTE — TELEPHONE ENCOUNTER
Pt ok to be off medication for now since pain and rash has improved; I will send refill for future in case has another flare

## 2023-06-12 NOTE — TELEPHONE ENCOUNTER
Disregard last message; I sent in another week of medication for him; we need to make sure it completely heals

## 2023-06-14 ENCOUNTER — PATIENT MESSAGE (OUTPATIENT)
Dept: FAMILY MEDICINE | Facility: CLINIC | Age: 53
End: 2023-06-14
Payer: COMMERCIAL

## 2023-11-16 DIAGNOSIS — N50.819 PAIN IN TESTICLE: Primary | ICD-10-CM

## 2024-11-20 ENCOUNTER — PATIENT MESSAGE (OUTPATIENT)
Dept: ADMINISTRATIVE | Facility: HOSPITAL | Age: 54
End: 2024-11-20
Payer: COMMERCIAL

## 2025-03-27 ENCOUNTER — PATIENT OUTREACH (OUTPATIENT)
Dept: ADMINISTRATIVE | Facility: HOSPITAL | Age: 55
End: 2025-03-27
Payer: COMMERCIAL

## 2025-03-27 NOTE — PROGRESS NOTES
Health Maintenance Topic(s) Outreach Outcomes & Actions Taken:    Primary Care Appt - Outreach Outcomes & Actions Taken  : Pt Established with External Provider, Updated Care Team, & Informed Pt to Notify Payor if Applicable